# Patient Record
Sex: MALE | Race: WHITE
[De-identification: names, ages, dates, MRNs, and addresses within clinical notes are randomized per-mention and may not be internally consistent; named-entity substitution may affect disease eponyms.]

---

## 2018-12-21 ENCOUNTER — HOSPITAL ENCOUNTER (EMERGENCY)
Dept: HOSPITAL 26 - MED | Age: 43
Discharge: HOME | End: 2018-12-21
Payer: COMMERCIAL

## 2018-12-21 VITALS — DIASTOLIC BLOOD PRESSURE: 65 MMHG | SYSTOLIC BLOOD PRESSURE: 136 MMHG

## 2018-12-21 VITALS — WEIGHT: 246 LBS | BODY MASS INDEX: 38.61 KG/M2 | HEIGHT: 67 IN

## 2018-12-21 VITALS — DIASTOLIC BLOOD PRESSURE: 55 MMHG | SYSTOLIC BLOOD PRESSURE: 114 MMHG

## 2018-12-21 DIAGNOSIS — N18.4: ICD-10-CM

## 2018-12-21 DIAGNOSIS — E11.22: ICD-10-CM

## 2018-12-21 DIAGNOSIS — G93.40: ICD-10-CM

## 2018-12-21 DIAGNOSIS — I12.9: ICD-10-CM

## 2018-12-21 DIAGNOSIS — K70.30: Primary | ICD-10-CM

## 2018-12-21 DIAGNOSIS — K76.6: ICD-10-CM

## 2018-12-21 LAB
ALBUMIN FLD-MCNC: 2.4 G/DL (ref 3.4–5)
ANION GAP SERPL CALCULATED.3IONS-SCNC: 13.1 MMOL/L (ref 8–16)
APPEARANCE UR: CLEAR
AST SERPL-CCNC: 54 U/L (ref 15–37)
BARBITURATES UR QL SCN: (no result) NG/ML
BASOPHILS # BLD AUTO: 0 K/UL (ref 0–0.22)
BASOPHILS NFR BLD AUTO: 0.1 % (ref 0–2)
BENZODIAZ UR QL SCN: (no result) NG/ML
BILIRUB SERPL-MCNC: 0.5 MG/DL (ref 0–1)
BILIRUB UR QL STRIP: NEGATIVE
BUN SERPL-MCNC: 33 MG/DL (ref 7–18)
BZE UR QL SCN: (no result) NG/ML
CANNABINOIDS UR QL SCN: (no result) NG/ML
CHLORIDE SERPL-SCNC: 111 MMOL/L (ref 98–107)
CO2 SERPL-SCNC: 21.4 MMOL/L (ref 21–32)
COLOR UR: YELLOW
CREAT SERPL-MCNC: 2.3 MG/DL (ref 0.7–1.3)
EOSINOPHIL # BLD AUTO: 0.2 K/UL (ref 0–0.4)
EOSINOPHIL NFR BLD AUTO: 3.8 % (ref 0–4)
ERYTHROCYTE [DISTWIDTH] IN BLOOD BY AUTOMATED COUNT: 14.2 % (ref 11.6–13.7)
GFR SERPL CREATININE-BSD FRML MDRD: 40 ML/MIN (ref 90–?)
GLUCOSE SERPL-MCNC: 245 MG/DL (ref 74–106)
GLUCOSE UR STRIP-MCNC: (no result) MG/DL
HCT VFR BLD AUTO: 26.5 % (ref 36–52)
HGB BLD-MCNC: 8.9 G/DL (ref 12–18)
HGB UR QL STRIP: NEGATIVE
KETONES TITR SERPL: NEGATIVE {TITER}
LEUKOCYTE ESTERASE UR QL STRIP: NEGATIVE
LYMPHOCYTES # BLD AUTO: 1 K/UL (ref 2–11.5)
LYMPHOCYTES NFR BLD AUTO: 24.7 % (ref 20.5–51.1)
MAGNESIUM SERPL-MCNC: 2.9 MG/DL (ref 1.8–2.4)
MCH RBC QN AUTO: 32 PG (ref 27–31)
MCHC RBC AUTO-ENTMCNC: 34 G/DL (ref 33–37)
MCV RBC AUTO: 95.3 FL (ref 80–94)
MONOCYTES # BLD AUTO: 0.4 K/UL (ref 0.8–1)
MONOCYTES NFR BLD AUTO: 10.7 % (ref 1.7–9.3)
NEUTROPHILS # BLD AUTO: 2.5 K/UL (ref 1.8–7.7)
NEUTROPHILS NFR BLD AUTO: 60.7 % (ref 42.2–75.2)
NITRITE UR QL STRIP: NEGATIVE
OPIATES UR QL SCN: (no result) NG/ML
PCP UR QL SCN: (no result) NG/ML
PH UR STRIP: 5.5 [PH] (ref 5–9)
PLATELET # BLD AUTO: 98 K/UL (ref 140–450)
POTASSIUM SERPL-SCNC: 4.5 MMOL/L (ref 3.5–5.1)
PROTHROMBIN TIME: 10.6 SECS (ref 10.8–13.4)
RBC # BLD AUTO: 2.78 MIL/UL (ref 4.2–6.1)
SODIUM SERPL-SCNC: 141 MMOL/L (ref 136–145)
URATE SERPL-MCNC: 5.5 MG/DL (ref 2.6–7.2)
WBC # BLD AUTO: 4.2 K/UL (ref 4.8–10.8)

## 2018-12-21 PROCEDURE — 83605 ASSAY OF LACTIC ACID: CPT

## 2018-12-21 PROCEDURE — 85610 PROTHROMBIN TIME: CPT

## 2018-12-21 PROCEDURE — 81003 URINALYSIS AUTO W/O SCOPE: CPT

## 2018-12-21 PROCEDURE — 85025 COMPLETE CBC W/AUTO DIFF WBC: CPT

## 2018-12-21 PROCEDURE — 70450 CT HEAD/BRAIN W/O DYE: CPT

## 2018-12-21 PROCEDURE — 80053 COMPREHEN METABOLIC PANEL: CPT

## 2018-12-21 PROCEDURE — 71045 X-RAY EXAM CHEST 1 VIEW: CPT

## 2018-12-21 PROCEDURE — 83036 HEMOGLOBIN GLYCOSYLATED A1C: CPT

## 2018-12-21 PROCEDURE — 80305 DRUG TEST PRSMV DIR OPT OBS: CPT

## 2018-12-21 PROCEDURE — 84484 ASSAY OF TROPONIN QUANT: CPT

## 2018-12-21 PROCEDURE — G0482 DRUG TEST DEF 15-21 CLASSES: HCPCS

## 2018-12-21 PROCEDURE — 84550 ASSAY OF BLOOD/URIC ACID: CPT

## 2018-12-21 PROCEDURE — 82009 KETONE BODYS QUAL: CPT

## 2018-12-21 PROCEDURE — 83735 ASSAY OF MAGNESIUM: CPT

## 2018-12-21 PROCEDURE — 93005 ELECTROCARDIOGRAM TRACING: CPT

## 2018-12-21 PROCEDURE — 82140 ASSAY OF AMMONIA: CPT

## 2018-12-21 PROCEDURE — 99284 EMERGENCY DEPT VISIT MOD MDM: CPT

## 2018-12-21 PROCEDURE — 36415 COLL VENOUS BLD VENIPUNCTURE: CPT

## 2018-12-21 NOTE — NUR
Pt. states , " I know im being discharged but I want to talk to the doctor". ER 
md brandt made aware.

## 2018-12-21 NOTE — NUR
Patient discharged with v/s stable. Written and verbal after care instructions 
given and explained. 

Patient alert, oriented and verbalized understanding of instructions. 
Ambulatory with steady gait. All questions addressed prior to discharge. ID 
band removed. Patient advised to follow up with PMD. Rx of lactulose 10g given. 
Patient educated on indication of medication including possible reaction and 
side effects. Opportunity to ask questions provided and answered.

Per MD brandt " Pt. is okay to be discharged ".

## 2018-12-21 NOTE — NUR
pt. resting comfortably in bed, hob elevated. pt. is sleeping at this time. 
Will continue to monitor.

## 2018-12-21 NOTE — NUR
PT. BIB WIFE DUE TO RUQ PAIN X TODAY AND ALOC YESTERDAY. GIRLFRIEND STATES " 
YESTERDAY HE STARTED ACTING A LITTLE DIFFERENT AND HE WOULD FORGET THINGS AND 
HE STARTED COMPLAINING OF THIS PAIN IN HIS STOMACH TODAY". PT. STATES i WAS D/C 
FROM [facility] ON 12/12/18 I WENT IN THERE BECAUSE MY AMMONIA LEVELS WERE 
HIGH. PT. HAS 8/10 RUQ PAIN THAT IS NON RADIAITING SHARP AND TENDER UPON 
PALPATION. VSS. ABD ROUND AND FIRM AND TENDER UPON PALPATION TO RUQ. DENIES ANY 
VOMITING/ ANY CHILLS OR DIARRHEA. SWELLING BILAT LEGS AND FEET X 2 DAYS 1+ NON 
PITTING EDEMA. PEDAL PULSES PRESENT. C/O OF NAUSEA. ER MD MADE AWARE. SAFETY 
PRECAUTIONS IMPLEMENTED. WILL CONTINUE TO MONITOR. FAMILY MEMBER AT BEDSIDE.

## 2021-08-02 NOTE — NUR
ADMITTED A MALE PATIENT FROM ER AAOX4. NO S/S OF RESPIRATORY DISTRESS, AFEBRILE. SKIN IS 
INTACT. ARON CATHETER ON THE RIGHT UPPER CHEST WITH DRESSING DRY AND INTACT. ALL SAFETY 
PRECAUTION IN PLACE. ORIENTED TO RESTROOM, CALL LIGHT, STAFF. MRSA SCREENING DONE. WILL 
CONTINUE TO MONITOR.

## 2021-08-02 NOTE — NUR
PT STATES THAT HE STILL FEELS HEADACHE, APPEARS MORE LETHARGIC. ERMD AT BEDSIDE 
EVALUATING PATIENT.

## 2021-08-02 NOTE — NUR
Patient will be admitted to care of DR. ZAPATA. Admited to TELEMMETRY.  Will go to 
room 121B. Belongings list completed.  Report to DAVIN ALMEIDA.

## 2021-08-03 NOTE — NUR
AT 0736 RECEIVED CRITICAL LAB REPORT FOR BUN 56 AND CREATININE 6.3. REPORTED TO DR ZAPATA. AT 
0751 RECEIVED CRITICAL FOR HGB 6.6, HCT 19.8, AND WBC 1.5. ALSO REPORTED TO DR ZAPATA. AWAITING 
ORDERS

## 2021-08-03 NOTE — NUR
RECEIVED REPORT FROM AM SHIFT RN. PATIENT IS RESTING IN BED. NO SOB NOTED. CALL LIGHT WITHIN 
REACH. DENIES PAIN.

## 2021-08-03 NOTE — NUR
RECEIVED THE ORDERS FOR HEMODIALYSIS NURSE, DIALYSIS NURSE AT BEDSIDE AND WILL START 
SHORTLY. AWAITING FOR THE [facility] TO START TRANSFUSION.

## 2021-08-03 NOTE — NUR
PATIENT HAS BEEN SCREENED AND CATEGORIZED AS MODERATE NUTRITION RISK. PATIENT WILL BE SEEN 
WITHIN 3-5 DAYS OF ADMISSION.



08/05/21 08/07/21



VIOLETA MUNSON RD

## 2021-08-03 NOTE — NUR
PT HAS BEEN ENDORSED BY NIGHT SHIFT NURSE FOR CONTINUITY OF CARE, POC DISCUSSED. PT RECEIVED 
A BLOOD TRANSFUSION IN ED ON THE 2ND. PT IS ALERT AND ORIENTED X4, ON ROOM AIR WITH CHEST 
RISING AND FALLING EVEN AND UNLABORED. PT IS SINUS LILIANA ON TELE MONITOR. PT IS CONTINENT 
AND ABLE TO AMBULATE TO RESTROOM. PT HAS A PORT-A-CATH IN PLACE FOR HEMODIALYSIS. PT HAD A 
BOWEL MOVEMENT ON THE 2ND AND SKIN IS INTACT. PT HAS A LEFT WRIST 20 G SALINE LOCK THAT IS 
PATIENT AND INTACT. ALL SAFETY MEASURES IN PLACE, CALL LIGHT WITHIN REACH. WILL CONTINUE TO 
MONITOR.

## 2021-08-03 NOTE — NUR
RECEIVED ORDERS TO GET HEMODIALYSIS STARTED, CALLED [facility]Y FROM [facility]. SHE WILL 
PLACE ORDERS. SPOKE TO DR HAGAN TO RECEIVE DIALYSIS ORDER.  STATED TO HAVE THE DIALYSIS 
NURSE TEXT HIM. INFORMED THE DIALYSIS NURSE.

## 2021-08-03 NOTE — NUR
DC PLANNING:

PATIENT ADMITTED FOR HEPATIC ENCEPHALOPATHY, HAS H/O ESRD WITH HD.

PATIENT GOES TO [facility] IN [place], M, W, TH, FR WITH NEPHROLOGIST DR. ISRAEL HAGAN.

PATIENT CHAIR TIMES ARE 1;30 PM ON M,W,F, 4;45 AM ON TH.

[facility] PROVIDES TRANSPORT, PATIENT HAS A CAREGIVER/SO WHO LIVES WITH HIM IN A GROUND FLOOR 
APARTMENT.

HAS DM, CHECKS HIS BS VIA GLUCOMETER TID, SEE HIS PCP REGULARLY.

INCOME OF SSDI, IS USUALLY INDEPENDENT WITH ADL'S AND AMBULATION BUT OCCASIONALLY REQUIRES 
ASSISTANCE WIT BOTH WHEN HE HAS INCREASED EDEMA, USES A SPC PRN.

PLAN WHEN PATIENT DC'S IS TO RESUME OP DIALYSIS, CM WILL FAX H&P AND DC SUMMARY.

CM WILL FOLLOW FOR NEEDS.

## 2021-08-04 NOTE — NUR
RECEIVED BEDSIDE REPORT FROM AM NURSE. PATIENT IN BED RESTING. NO S/S OF RESPIRATORY 
DISTRESS. CALL LIGHT WITHIN REACH. SAFETY MEASURES IN PLACE. WILL CONTINUE TO MONITOR.

## 2021-08-04 NOTE — NUR
ALL SCHEDULED MEDICATIONS ADMINISTERED. PATIENT DOES NOT COMPLAIN OF ANY PAIN. NO S/S OF 
DISTRESS. CALL LIGHT WITHIN REACH, ALL SAFETY MEASURES IN PLACE.

## 2021-08-04 NOTE — NUR
PATIENT IS SLEEPING. NO S/S OF RESPIRATORY DISTRESS. ALL SAFETY PRECAUTIONS IN PLACE. CALL 
LIGHT WITHIN REACH.

## 2021-08-04 NOTE — NUR
RECEIVED BEDSIDE REPORT FROM NIGHT SHIFT NURSE FOR CONTINUITY OF CARE. PT IS ALERT AND 
ORIENTED X4, ON ROOM AIR. NO S/S OF DISTRESS. PT IS CONTINENT AND ABLE TO AMBULATE TO 
RESTROOM. PT HAS A RIGHT UPPER CHEST PORT-A-CATH IN PLACE FOR HEMODIALYSIS. PT HAS A LEFT 
WRIST 22 G SALINE LOCK THAT IS PATENT AND INTACT. PLAN OF CARE DISCUSSED. ALL SAFETY 
MEASURES IN PLACE, CALL LIGHT WITHIN REACH. WILL CONTINUE TO MONITOR. 

-------------------------------------------------------------------------------

Addendum: 08/04/21 at 1833 by Laura Acevedo RN RN

-------------------------------------------------------------------------------

PT HAS RIGHT UPPER CHEST TUNNEL CATH INSTEAD OF A PORT-A-CATH.

## 2021-08-04 NOTE — NUR
CHECKED PATIENTS BLOOD SUGAR. BLOOD SUGAR IS . ADMINISTERED 2 UNITS OF INSULIN PER 
SLIDING SCALE. CALL LIGHT WITHIN REACH. ALL SAFETY MEASURES IN PLACE.

## 2021-08-05 NOTE — NUR
ROUNDED ON PT, PT VERBALIZED ALL NEEDS ARE MET AT THIS TIME. ALL SAFETY MEASURES IN PLACE, 
CALL LIGHT WITHIN REACH. WILL CONTINUE TO MONITOR.

## 2021-08-05 NOTE — NUR
BLOOD GLUCOSE , 2 UNITS OF INSULIN ADMINISTERED PER MD ORDER. PT VERBALIZED ALL NEEDS 
ARE MET. ALL SAFETY MEASURES IN PLACE. CALL LIGHT WITHIN REACH. WILL CONTINUE TO MONITOR.

## 2021-08-05 NOTE — NUR
MIKE MEDICATION ADMINISTERED PER MD ORDER, PT EDUCATION PROVIDED AND PT VERBALIZED 
UNDERSTANDING. PT STARTED ON LACTULOSE DUE TO INCREASED AMMONIA LEVEL, OCCULT STOOL CAME 
BACK NEGATIVE. PT VERBALIZED ALL NEEDS ARE MET. IV DRESSING CHANGED AND AND IV IS PATENT AND 
INTACT. PT VERBALIZED UNDERSTANDING ON KNOWN HOW TO USE THE CALL LIGHT. ALL SAFETY MEASURES 
IN PLACE, CALL LIGHT WITHIN REACH. WILL CONTINUE TO MONITOR.

## 2021-08-05 NOTE — NUR
PT COMPLAINED OF A MILD COUGH, LUNG SOUNDS ARE CLEAR, NO FEVER NOTED. STATED HE THINKS IT 
COULD BE FROM THE COLD ROOM. TURNED ROOM TEMP UP. EDUCATED PT ON REPORTING IF THE COUGH 
INCREASES OR CONTINUES. WILL CONTINUE TO MONITOR. BROUGHT PT WATER TO SEE IF THAT HELPS, 
STATED IT HELPS A LITTLE.

## 2021-08-05 NOTE — NUR
RECEIVED REPORT FROM AM NURSE. PATIENT IS ASLEEP. NO S/S OF SOB. RESPIRATION EVEN UNLABORED. 
SAFETY MEASURES IN PLACE. CALL LIGHT WITHIN REACH.

## 2021-08-05 NOTE — NUR
PT ENDORSE BY NIGHT SHIFT NURSE FOR CONTINUITY OF CARE, POC DISCUSSED. PT IS RESTING 
COMFORTABLE IN BED WITH NO S/S OF ACUTE DISTRESS. PT DENIES PAIN AND REPORTS ALL NEEDS ARE 
MET AT THIS TIME. PT IS ALERT AND ORIENTED X4. ON ROOM AIR WITH CHEST RISING AND FALLING 
EVEN AND UNLABORED WITH NO S/S OF ACUTE DISTRESS. SKIN INTACT. PT HAS A LEFT WRIST 22G 
SALINE LOCK. PT HAS A LEFT TUNNELED CATH AND RECEIVED DIALYSIS YESTERDAY AND REMOVED 2L. ALL 
SAFETY MEASURES IN PLACE, CALL LIGHT WITHIN REACH. WILL CONTINUE TO MONITOR.

## 2021-08-05 NOTE — NUR
ASSESSED PT BLOOD GLUCOSE, IT , 6 UNITS ADMINISTERED PER MD ORDER/ SLIDING SCALE. ALL 
SAFETY MEASURES IN PLACE, CALL LIGHT WITHIN REACH. WILL CONTINUE TO MONITOR.

## 2021-08-05 NOTE — NUR
ROUNDED ON PT, PT IS RESTING COMFORTABLE IN BED WITH NO S/S OF ACUTE DISTRESS. VERBALIZED 
ALL NEEDS ARE MET. PT IS STABLE. ALL SAFETY MEASURES IN PLACE, CALL LIGHT WITHIN REACH. WILL 
CONTINUE TO MONITOR.

## 2021-08-06 NOTE — NUR
PATIENT COMPLETED HD TREATMENT. 3 L OF FLUIDS REMOVED. PT IS STABLE. NO DISTRESS NOTED. WILL 
CONTINUE TO MONITOR.

## 2021-08-06 NOTE — NUR
PT ENDORSED BY NIGHT SHIFT NURSE FOR CONTINUITY OF CARE, POC DISCUSSED. PT IS RESTING IN BED 
WITH NO S/S OF ACUTE DISTRESS. PT DENIES PAIN AND REPORTS ALL NEEDS ARE MET AT THIS TIME. PT 
IS ALERT AND ORIENTED X4. ON ROOM AIR WITH CHEST RISING AND FALLING EVEN AND UNLABORED WITH 
NO S/S OF ACUTE DISTRESS. SKIN INTACT. PT HAS A LEFT WRIST 22G SALINE LOCK. PT HAS A RIGHT 
TUNNELED CATH FOR DIALYSIS. ALL SAFETY MEASURES IN PLACE, CALL LIGHT WITHIN REACH. WILL 
CONTINUE TO MONITOR.

## 2021-08-06 NOTE — NUR
ADMINISTERED SCHEDULED MEDICATIONS TO PATIENT. PATIENT VERBALIZED UNDERSTANDING. NO S/S OF 
DISTRESS. CALL LIGHT WITHIN REACH, ALL SAFETY MEASURES IN PLACE. WILL CONTINUE TO MONITOR.

## 2021-08-06 NOTE — NUR
8/6/21 RD INITIAL ASSESSMENT COMPLETED



PLEASE REFER TO NUTRITION ASSESSMENT UNDER CARE ACTIVITY FOR ESTIMATED NUTRITIONAL NEEDS. 



1. RECOMMEND CCHO AND RENAL DIET 

2. RD WILL F.U WITH NUTRITION EDUCATION, PROVIDE H.O ABOUT CCHO AND RENAL DIET

3. RD TO FOLLOW-UP 5-7 DAYS, LOW RISK 



VILOETA MUNSON RD

## 2021-08-07 NOTE — NUR
dr jan conde back and ordered gi consult dr pilar pagan i tried to put in the order 
but i could not and  one nurse who works here told me that the dr has to put the order pt 
vomitted twice michael blood .i passed report to day nurse

## 2021-08-08 NOTE — NUR
TEXTED DR. ZAPATA AT 0904 TO REPORT CRITICAL LOW LEVEL WBC OF 1.6

NO RESPONSE HAS BEEN RECEIVED YET.

## 2021-08-08 NOTE — NUR
PT SLEPT WELL PAIN WAS WELL CONTROLLED WAS GIVEN ZOFRAN FOR VOMITTING  HE DID NOT VOMIT 
AGAIN, REFUSED INSULIN BECAUSE HE HAD INSULIN LAST NIGHT ,VSS

## 2021-08-09 NOTE — NUR
NURSE REPORT



REPORT GIVEN TO NIGHT NURSE NATE AT 1930. ASKED HIM TO CHECK WHY OCTEOTIDE NEEDS TO BE 
GIVEN. VSS AND AFEB.

## 2021-08-09 NOTE — NUR
BLOOD GLUCOSE IS 228MG/DL. LISPRO 4 UNITS GIVEN PATIENT ASSESSMENT. NO COMPLAINTS FOR 
AMBULATION. TEACHING ON DIABETIC MANAGEMENT. ICE CHIPS PROVIDED AT THIS TIME. NATE ESCALANTE RN

## 2021-08-09 NOTE — NUR
PT SLEPT WELL PAIN WAS WELL CONTROLLED VSS , DENIES PAIN AT THE MOMENT , BLOOD SUGAR 148 NO 
COVERAGE

## 2021-08-09 NOTE — NUR
NURSE NOTES



DIALYSIS NURSE DOING HD. EPOETIN TO BE GIVEN AFTERWARD.HD NURSE WILL USE HEAPRIN 5000 UNITS 
WHEN HD IS DONE.

## 2021-08-09 NOTE — NUR
NURSE CARE



BG BEFORE LUNCH AND AFTER DIALYSIS- 204. GIVEN  4 UNITS OF HUMALOG INSULIN AS ORDERED.

## 2021-08-09 NOTE — NUR
NURSE REPORT



REPORT OBTAINED FROM NIGHT NURSE AT 0720 AND THIS NURSE ASSUME CARE OF PATIENT.  PATIENT 
EATING BREAKFAST AND DR MACHADO SAID HE WANTS TO DO EGD.  CANNOT SINCE HE IS EATING. VSS.AFEB.

## 2021-08-10 NOTE — NUR
PATIENT AWARE NPO STATUS AND PROCEDURE WILL BE AT 9 AM PER HOUSE SUPERVISOR RECORD. PATIENT 
EDUCATION TO TOILETTE PRIOR TO PROCEDURE AND BE READY TO LEAVE EARLIER THAN SCHEDULE EXAM. 
NATE ESCALANTE RN

## 2021-08-10 NOTE — NUR
ROUNDS FOR VITAL SIGNS. PATIENT ASKING ABOUT PROCEDURE TIME. NOTIFIED WILL LIKELY BE AT SAME 
TIME AS YESTERDAY. WILL REQUEST INFORMATION FROM HOUSE SUP. NATE ESCALANTE RN

## 2021-08-10 NOTE — NUR
NURSE REPORT



Report obtained from night nurse Ever and this nurse assume care of patient. Received 
patient asleep at beginning of shift. No sxs of pain or discomfort. VSS. Afeb. NPO for EGD

## 2021-08-10 NOTE — NUR
NURSE CARE



BLOOD GLUCOSE IN AM- 160. NPO FOR EGD.



BEFORE LUNCH 167. PATIENT REFUSED TO EAT, SAYING HE DON'T HAVE THE APPETITE. ONDASETRON 4 MG 
GIVEN IVP X 1 AFTER HE RETURNED FROM EGD.



AT 1700- , BUT HE STATED HE WILL TAKE PO MEDS AT HOME.



DC'D HOME VIA W/C WITH THIS NURSE AND DAUGHTER TOOK PATIENT HOME.

## 2022-05-04 NOTE — NUR
Body Location Override (Optional - Billing Will Still Be Based On Selected Body Map Location If Applicable): Right Neck PATIENT COMPLETED HEMODIALYSES TREATMENT. 2L OF FLUID WERE REMOVED.